# Patient Record
Sex: FEMALE | Race: OTHER | HISPANIC OR LATINO | Employment: FULL TIME | URBAN - METROPOLITAN AREA
[De-identification: names, ages, dates, MRNs, and addresses within clinical notes are randomized per-mention and may not be internally consistent; named-entity substitution may affect disease eponyms.]

---

## 2019-08-14 ENCOUNTER — APPOINTMENT (OUTPATIENT)
Dept: URGENT CARE | Facility: MEDICAL CENTER | Age: 34
End: 2019-08-14
Payer: OTHER MISCELLANEOUS

## 2019-08-14 ENCOUNTER — TRANSCRIBE ORDERS (OUTPATIENT)
Dept: URGENT CARE | Facility: MEDICAL CENTER | Age: 34
End: 2019-08-14

## 2019-08-14 ENCOUNTER — APPOINTMENT (OUTPATIENT)
Dept: RADIOLOGY | Facility: MEDICAL CENTER | Age: 34
End: 2019-08-14
Payer: OTHER MISCELLANEOUS

## 2019-08-14 DIAGNOSIS — T14.90XA INJURY: Primary | ICD-10-CM

## 2019-08-14 DIAGNOSIS — M54.5 ACUTE MIDLINE LOW BACK PAIN, WITH SCIATICA PRESENCE UNSPECIFIED: Primary | ICD-10-CM

## 2019-08-14 DIAGNOSIS — M54.5 ACUTE MIDLINE LOW BACK PAIN, WITH SCIATICA PRESENCE UNSPECIFIED: ICD-10-CM

## 2019-08-14 DIAGNOSIS — T14.90XA INJURY: ICD-10-CM

## 2019-08-14 PROCEDURE — 72100 X-RAY EXAM L-S SPINE 2/3 VWS: CPT

## 2019-08-14 PROCEDURE — 96372 THER/PROPH/DIAG INJ SC/IM: CPT | Performed by: PHYSICIAN ASSISTANT

## 2019-08-14 PROCEDURE — G0382 LEV 3 HOSP TYPE B ED VISIT: HCPCS | Performed by: PHYSICIAN ASSISTANT

## 2019-08-14 PROCEDURE — 99283 EMERGENCY DEPT VISIT LOW MDM: CPT | Performed by: PHYSICIAN ASSISTANT

## 2019-08-16 ENCOUNTER — APPOINTMENT (OUTPATIENT)
Dept: URGENT CARE | Age: 34
End: 2019-08-16
Payer: OTHER MISCELLANEOUS

## 2019-08-16 PROCEDURE — 99202 OFFICE O/P NEW SF 15 MIN: CPT | Performed by: PHYSICIAN ASSISTANT

## 2019-08-21 ENCOUNTER — APPOINTMENT (OUTPATIENT)
Dept: URGENT CARE | Facility: MEDICAL CENTER | Age: 34
End: 2019-08-21
Payer: OTHER MISCELLANEOUS

## 2019-08-21 PROCEDURE — 99212 OFFICE O/P EST SF 10 MIN: CPT | Performed by: PHYSICIAN ASSISTANT

## 2019-08-27 ENCOUNTER — APPOINTMENT (OUTPATIENT)
Dept: URGENT CARE | Facility: MEDICAL CENTER | Age: 34
End: 2019-08-27
Payer: OTHER MISCELLANEOUS

## 2019-08-27 PROCEDURE — 99213 OFFICE O/P EST LOW 20 MIN: CPT | Performed by: FAMILY MEDICINE

## 2019-09-25 ENCOUNTER — APPOINTMENT (OUTPATIENT)
Dept: URGENT CARE | Facility: MEDICAL CENTER | Age: 34
End: 2019-09-25
Payer: OTHER MISCELLANEOUS

## 2019-09-25 PROCEDURE — 99213 OFFICE O/P EST LOW 20 MIN: CPT | Performed by: PHYSICIAN ASSISTANT

## 2020-02-01 ENCOUNTER — HOSPITAL ENCOUNTER (EMERGENCY)
Facility: HOSPITAL | Age: 35
Discharge: HOME | End: 2020-02-01
Attending: EMERGENCY MEDICINE

## 2020-02-01 VITALS
RESPIRATION RATE: 16 BRPM | TEMPERATURE: 97.9 F | HEART RATE: 99 BPM | OXYGEN SATURATION: 100 % | WEIGHT: 208.56 LBS | SYSTOLIC BLOOD PRESSURE: 129 MMHG | DIASTOLIC BLOOD PRESSURE: 81 MMHG

## 2020-02-01 DIAGNOSIS — F32.A DEPRESSION: Primary | ICD-10-CM

## 2020-02-01 PROCEDURE — 99284 EMERGENCY DEPT VISIT MOD MDM: CPT | Performed by: EMERGENCY MEDICINE

## 2020-02-01 PROCEDURE — 99284 EMERGENCY DEPT VISIT MOD MDM: CPT

## 2020-02-01 RX ORDER — HYDROXYZINE HYDROCHLORIDE 25 MG/1
25 TABLET, FILM COATED ORAL ONCE
Status: COMPLETED | OUTPATIENT
Start: 2020-02-01 | End: 2020-02-01

## 2020-02-01 RX ORDER — HYDROXYZINE HYDROCHLORIDE 25 MG/1
25 TABLET, FILM COATED ORAL EVERY 6 HOURS PRN
Qty: 12 TABLET | Refills: 0 | Status: SHIPPED | OUTPATIENT
Start: 2020-02-01

## 2020-02-01 RX ADMIN — HYDROXYZINE HYDROCHLORIDE 25 MG: 25 TABLET ORAL at 17:20

## 2020-02-01 NOTE — ED NOTES
Pt presents with her   Pt refuses to speak with this nurse and Dr Grace Began  Per pt's , pt's children are in the [facility] x5 years  Pt reported got a phone call from daughter (3 days ago) and daughter was upset about a hair cut  Since then pt has been upset, repeating her children's names, not eating, and not sleeping  Pt cooperative and calm at this time        Lola Parr RN  02/01/20 4735

## 2020-02-01 NOTE — ED NOTES
Pt is a 36 y o  female who was brought to the ED with   Chief Complaint   Patient presents with    Depression     pt  reports increased depression x4 days, pt repeats her daughters name over and over again  pt  says pt's daughter is in [place] and she recently spoke to her and saw she had a hair cut and since then pt has been crying and depressed  when asked if pt is having SI pt just says her daughters name   Pt brought to the ED by her  with complaints of bizzare behavior, Pt  reports that has been acting bizzare for the past 4days, pt continues to say the names of her 3 children over and over, Pt  reports that pt 3 children are in the [place], and that pt has not seen her kids in over 5 yrs  Pt  report that 4 days ago pt received a video chat from her daughter and the the daughter has a short hair cut, since then pt has not been eating of sleeping well , pt continues to repeat the names of her children  Durng this assement pt continues to say the names of her children, and only answers in [address]  Pt denies S/I,H/I, (by shaking her head ), When asked A/H,V/H (pt will not answer, pt just looks at her  and says the name of her 3 children)  Intake Assessment completed, Safety risk Assessment completed  CW met with pt and pt , Pt continues to deny S/I,H/I, by shaking her head, Pt continues to state her children's name when asked about A/H,V/H  Pt  is requesting something to help pt sleep  CW discussed this case and pt plan with ED Physician who is in agreement with this plan  Pt will be discharged per ED Physician, June Roche provided pt with referrals for out pt follow up care            [address] [place] [address]

## 2020-02-01 NOTE — ED PROVIDER NOTES
History  Chief Complaint   Patient presents with    Depression     pt  reports increased depression x4 days, pt repeats her daughters name over and over again  pt  says pt's daughter is in [place] and she recently spoke to her and saw she had a hair cut and since then pt has been crying and depressed  when asked if pt is having SI pt just says her daughters name     37 yo female brought in to the ED by her  for evaluation of depression  The patient's  says that she has been repeating her three children's names "over and over" for the past 3 days  She has also had difficulty sleeping and a poor appetite  Everything started after she saw a picture of her daughter with a "bad haircut"  Her children all live with their grandparents in the [facility] --> she says she hasn't seen them in person in 5 years  (+) Increased crying  The patient denies SI and HI  No hallucinations  No history of psychiatric illness or suicide attempts  No other complaints  None       History reviewed  No pertinent past medical history  Past Surgical History:   Procedure Laterality Date    HYSTERECTOMY         History reviewed  No pertinent family history  I have reviewed and agree with the history as documented  Social History     Tobacco Use    Smoking status: Never Smoker    Smokeless tobacco: Never Used   Substance Use Topics    Alcohol use: Never     Frequency: Never    Drug use: Never        Review of Systems   Constitutional: Negative for chills and fever  HENT: Negative for sore throat  Eyes: Negative for visual disturbance  Respiratory: Negative for shortness of breath  Cardiovascular: Negative for chest pain  Gastrointestinal: Negative for abdominal pain, diarrhea and vomiting  Endocrine: Negative for cold intolerance and heat intolerance  Genitourinary: Negative for dysuria and frequency  Musculoskeletal: Negative for back pain  Skin: Negative for rash  Allergic/Immunologic: Negative for immunocompromised state  Neurological: Negative for weakness and numbness  Hematological: Negative for adenopathy  Psychiatric/Behavioral: Positive for dysphoric mood and sleep disturbance  Negative for hallucinations, self-injury and suicidal ideas  Physical Exam  Physical Exam   Constitutional: She is oriented to person, place, and time  She appears well-developed and well-nourished  No distress  HENT:   Head: Normocephalic and atraumatic  Eyes: Pupils are equal, round, and reactive to light  EOM are normal    Neck: Normal range of motion  Neck supple  Cardiovascular: Normal rate and regular rhythm  Pulmonary/Chest: Effort normal and breath sounds normal    Abdominal: Soft  She exhibits no distension  There is no tenderness  Musculoskeletal: Normal range of motion  She exhibits no edema  Neurological: She is alert and oriented to person, place, and time  Skin: Skin is warm and dry  Psychiatric: She is not actively hallucinating  Thought content is not delusional  Cognition and memory are normal  She exhibits a depressed mood  She expresses no homicidal and no suicidal ideation  She expresses no suicidal plans and no homicidal plans         Vital Signs  ED Triage Vitals [02/01/20 1600]   Temperature Pulse Respirations Blood Pressure SpO2   97 9 °F (36 6 °C) 99 16 129/81 100 %      Temp Source Heart Rate Source Patient Position - Orthostatic VS BP Location FiO2 (%)   Tympanic Monitor Sitting Left arm --      Pain Score       --           Vitals:    02/01/20 1600   BP: 129/81   Pulse: 99   Patient Position - Orthostatic VS: Sitting         Visual Acuity      ED Medications  Medications   hydrOXYzine HCL (ATARAX) tablet 25 mg (25 mg Oral Given 2/1/20 1720)       Diagnostic Studies  Results Reviewed     None                 No orders to display              Procedures  Procedures         ED Course                               MDM  Number of Diagnoses or Management Options  Depression:   Diagnosis management comments: The patient is obviously depressed but denies SI and HI  No auditory or visual hallucinations  Case discussed with Crisis --> they will evaluate the patient in the ED  Disposition per crisis worker  Patient Progress  Patient progress: stable        Disposition  Final diagnoses:   Depression     Time reflects when diagnosis was documented in both MDM as applicable and the Disposition within this note     Time User Action Codes Description Comment    2/1/2020  5:11 PM Mallorie Almaguer Add [F32 9] Depression       ED Disposition     ED Disposition Condition Date/Time Comment    Discharge Stable Sat Feb 1, 2020  5:11 PM [facility] discharge to home/self care  MD Documentation      Most Recent Value   Sending MD DR LEAH CHRISTINE       Follow-up Information     Follow up With Specialties Details Why Contact Info Additional [address] Family Medicine Schedule an appointment as soon as possible for a visit   [address] [facility] [address], [address]  [address], [address], [address] [place], [place], [address]          Discharge Medication List as of 2/1/2020  5:14 PM      START taking these medications    Details   hydrOXYzine HCL (ATARAX) 25 mg tablet Take 1 tablet (25 mg total) by mouth every 6 (six) hours as needed for anxiety, Starting Sat 2/1/2020, Print           No discharge procedures on file      ED Provider  Electronically Signed by           Nori Gallego MD  02/01/20 7400